# Patient Record
Sex: MALE | Race: WHITE
[De-identification: names, ages, dates, MRNs, and addresses within clinical notes are randomized per-mention and may not be internally consistent; named-entity substitution may affect disease eponyms.]

---

## 2019-03-12 NOTE — HP
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

numbness and tingling in the median nerve distribution of his right hand.  He has

had no specific injury.  He has had persistent symptoms despite restriction of

activities, anti-inflammatory medications, and splinting.  His symptoms are now

interfering with day-to-day activities and also sleeping. 



PAST MEDICAL HISTORY:  The patient is otherwise in good health.  He does have a

history of some neck pain, radiating into his right shoulder, but this is relatively

minor.  He has history of high cholesterol. 



CURRENT MEDICATIONS:  Include:

1. Lipitor.

2. Meloxicam.

3. Finasteride.



ALLERGIES:  HE HAS NO KNOWN ALLERGIES.



FAMILY HISTORY:  Otherwise unremarkable.



SOCIAL HISTORY:  Otherwise unremarkable.



REVIEW OF SYSTEMS:  Otherwise unremarkable.



PHYSICAL EXAMINATION:

GENERAL:  Reveals a healthy male. 

HEENT:  Unremarkable. 

NECK:  Supple. 

CHEST:  Clear. 

HEART:  Regular rate and rhythm. 

ABDOMEN:  Soft, nontender. 

RECTAL:  Deferred.   

GENITAL:  Deferred. 

EXTREMITIES:  Pertinent findings related to the right wrist.  There is no swelling.

There is no atrophy.  There are no open wounds.  There is no point tenderness.

There is full range of motion.  Motor exam is intact.  There is subjective numbness

in median nerve distribution.  There is a negative Tinel sign and positive Phalen's

test.  There is good capillary refill and palpable distal pulses. 



DIAGNOSTIC STUDIES:  Electrodiagnostic studies reveal moderate-to-severe right

carpal tunnel syndrome and some evidence of chronic right C5 radiculopathy without

nerve denervation. 



IMPRESSION:  The right carpal tunnel syndrome, possible component of cervical

radiculopathy. 



PLAN:  Endoscopic possible open right carpal tunnel release.  The nature of the

surgery, length of recovery, potential complications such as infection, loss of

motion, incomplete relief, nerve injury, recurrence, need for additional treatment,

repeat surgery were discussed in detail.  He would require further evaluation and

workup, if he is still experiencing symptoms related to his cervical spine. 







Job ID:  [id number]

## 2019-03-13 ENCOUNTER — HOSPITAL ENCOUNTER (OUTPATIENT)
Dept: HOSPITAL 92 - SDC | Age: 39
Discharge: HOME | End: 2019-03-13
Attending: ORTHOPAEDIC SURGERY
Payer: COMMERCIAL

## 2019-03-13 VITALS — BODY MASS INDEX: 34.8 KG/M2

## 2019-03-13 DIAGNOSIS — G56.01: Primary | ICD-10-CM

## 2019-03-13 DIAGNOSIS — Z79.899: ICD-10-CM

## 2019-03-13 DIAGNOSIS — E78.00: ICD-10-CM

## 2019-03-13 PROCEDURE — 01N54ZZ RELEASE MEDIAN NERVE, PERCUTANEOUS ENDOSCOPIC APPROACH: ICD-10-PCS | Performed by: ORTHOPAEDIC SURGERY

## 2022-01-17 ENCOUNTER — HOSPITAL ENCOUNTER (OUTPATIENT)
Dept: HOSPITAL 92 - LABBT | Age: 42
Discharge: HOME | End: 2022-01-17
Attending: ORTHOPAEDIC SURGERY
Payer: COMMERCIAL

## 2022-01-17 DIAGNOSIS — S46.212A: ICD-10-CM

## 2022-01-17 DIAGNOSIS — Z01.812: Primary | ICD-10-CM

## 2022-01-17 DIAGNOSIS — Z20.822: ICD-10-CM

## 2022-01-17 LAB
BASOPHILS # BLD AUTO: 0.1 10X3/UL (ref 0–0.2)
BASOPHILS NFR BLD AUTO: 0.6 % (ref 0–2)
EOSINOPHIL # BLD AUTO: 0.2 10X3/UL (ref 0–0.5)
EOSINOPHIL NFR BLD AUTO: 2 % (ref 0–6)
HGB BLD-MCNC: 15.9 G/DL (ref 13.5–17.5)
LYMPHOCYTES NFR BLD AUTO: 34.9 % (ref 18–47)
MCH RBC QN AUTO: 32.6 PG (ref 27–33)
MCV RBC AUTO: 94.9 FL (ref 81.2–95.1)
MONOCYTES # BLD AUTO: 0.8 10X3/UL (ref 0–1.1)
MONOCYTES NFR BLD AUTO: 9.7 % (ref 0–10)
NEUTROPHILS # BLD AUTO: 4.2 10X3/UL (ref 1.5–8.4)
NEUTROPHILS NFR BLD AUTO: 52.4 % (ref 40–75)
PLATELET # BLD AUTO: 304 10X3/UL (ref 150–450)
RBC # BLD AUTO: 4.88 10X6/UL (ref 4.32–5.72)
WBC # BLD AUTO: 8.1 10X3/UL (ref 3.5–10.5)

## 2022-01-17 PROCEDURE — U0003 INFECTIOUS AGENT DETECTION BY NUCLEIC ACID (DNA OR RNA); SEVERE ACUTE RESPIRATORY SYNDROME CORONAVIRUS 2 (SARS-COV-2) (CORONAVIRUS DISEASE [COVID-19]), AMPLIFIED PROBE TECHNIQUE, MAKING USE OF HIGH THROUGHPUT TECHNOLOGIES AS DESCRIBED BY CMS-2020-01-R: HCPCS

## 2022-01-17 PROCEDURE — U0005 INFEC AGEN DETEC AMPLI PROBE: HCPCS

## 2022-01-17 PROCEDURE — 85025 COMPLETE CBC W/AUTO DIFF WBC: CPT

## 2022-01-21 ENCOUNTER — HOSPITAL ENCOUNTER (OUTPATIENT)
Dept: HOSPITAL 92 - LABBT | Age: 42
Discharge: HOME | End: 2022-01-21
Attending: ORTHOPAEDIC SURGERY
Payer: COMMERCIAL

## 2022-01-21 DIAGNOSIS — Z20.822: ICD-10-CM

## 2022-01-21 DIAGNOSIS — Z01.818: Primary | ICD-10-CM

## 2022-01-21 DIAGNOSIS — S46.212A: ICD-10-CM

## 2022-01-21 PROCEDURE — 93005 ELECTROCARDIOGRAM TRACING: CPT

## 2022-01-21 PROCEDURE — 93010 ELECTROCARDIOGRAM REPORT: CPT

## 2022-01-21 PROCEDURE — U0003 INFECTIOUS AGENT DETECTION BY NUCLEIC ACID (DNA OR RNA); SEVERE ACUTE RESPIRATORY SYNDROME CORONAVIRUS 2 (SARS-COV-2) (CORONAVIRUS DISEASE [COVID-19]), AMPLIFIED PROBE TECHNIQUE, MAKING USE OF HIGH THROUGHPUT TECHNOLOGIES AS DESCRIBED BY CMS-2020-01-R: HCPCS

## 2022-01-21 PROCEDURE — U0005 INFEC AGEN DETEC AMPLI PROBE: HCPCS

## 2022-01-25 ENCOUNTER — HOSPITAL ENCOUNTER (OUTPATIENT)
Dept: HOSPITAL 92 - SDC | Age: 42
Discharge: HOME | End: 2022-01-25
Attending: ORTHOPAEDIC SURGERY
Payer: COMMERCIAL

## 2022-01-25 VITALS — BODY MASS INDEX: 36.2 KG/M2

## 2022-01-25 DIAGNOSIS — Z79.899: ICD-10-CM

## 2022-01-25 DIAGNOSIS — S46.212A: Primary | ICD-10-CM

## 2022-01-25 DIAGNOSIS — E78.00: ICD-10-CM
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
## 2022-01-25 PROCEDURE — 0LM40ZZ REATTACHMENT OF LEFT UPPER ARM TENDON, OPEN APPROACH: ICD-10-PCS | Performed by: ORTHOPAEDIC SURGERY

## 2022-01-25 PROCEDURE — 76000 FLUOROSCOPY <1 HR PHYS/QHP: CPT

## 2022-01-25 PROCEDURE — C1713 ANCHOR/SCREW BN/BN,TIS/BN: HCPCS

## 2022-01-25 PROCEDURE — 3E0T3BZ INTRODUCTION OF ANESTHETIC AGENT INTO PERIPHERAL NERVES AND PLEXI, PERCUTANEOUS APPROACH: ICD-10-PCS | Performed by: ORTHOPAEDIC SURGERY
